# Patient Record
(demographics unavailable — no encounter records)

---

## 2024-10-14 NOTE — DISCUSSION/SUMMARY
[de-identified] : The underlying pathophysiology was reviewed with the patient. XR films were reviewed with the patient. Discussed at length the nature of the patient's condition. Advised to start physical therapy and script given.   Option of shoulder replacement was discussed. Patient was advised to continue with therapy for increased ROM and strengthening. A new script was provided.  The patient was encouraged to take Calcium Citrate, Vitamin D3 and Vitamin C along with a high calcium diet is advised to promote bone health and healing. Patient can continue activities as tolerated. All questions answered, understanding verbalized. Patient in agreement with plan of care.   Patient was advised to follow up PRN.

## 2024-10-14 NOTE — PHYSICAL EXAM
[de-identified] : Patient is WDWN, alert, and in no acute distress. Breathing is unlabored. She is grossly oriented to person, place, and time.  Right Shoulder:  Inspection/ palpation: There is tenderness present,  Range of motion: 90 degrees ROM  Strength: Flexion and extension 5/5.  Stability: No joint instability on provocative testing. No skin lesions or discoloration. [de-identified] : AP, lateral and oblique views of the right shoulder were obtained today and revealed a displaced comminuted fractures of the right humeral head/and mid diaphysis, Fracture is fully healed.

## 2024-10-14 NOTE — HISTORY OF PRESENT ILLNESS
[de-identified] : Pt is a 74 y/o female with right humeral neck and shaft fracture.  She tripped and fell in her daughters home on 10/29/23.  She had pain immediately.  She went to [facility] ED where xrays revealed a displaced humeral shaft fracture.  The fracture was reduced and a splint was applied.  She was admitted to the hospital for pain control.  She was discharged this morning.  She is taking Tylenol for pain and was given oxycodone. She returns on 12/07/2023 for follow up. She reports significant discomfort but not so much pain.    Today, Oct 14,2024 patient is here for a follow up and further evaluation. She states that the pain is persistent.

## 2024-10-14 NOTE — ADDENDUM
[FreeTextEntry1] : This note was written by Jose Batres on 10/14/2024 actively solely JOÃO Carroll M.D.     All medical record entries made by the Scribe were at my, JOÃO Carroll M.D. direction and personally dictated by me on 10/14/2024. I have personally reviewed the chart and agree that the record reflects my personal performance of the history, physical exam, assessment, and plan.

## 2024-10-22 NOTE — PHYSICAL EXAM
[No Acute Distress] : no acute distress [Well Nourished] : well nourished [Normal Voice/Communication] : normal voice/communication [Normal Sclera/Conjunctiva] : normal sclera/conjunctiva [Normal Outer Ear/Nose] : the outer ears and nose were normal in appearance [Normal] : normal gait, coordination grossly intact, no focal deficits and deep tendon reflexes were 2+ and symmetric [de-identified] : reduced ROM R shoulder

## 2024-10-22 NOTE — HEALTH RISK ASSESSMENT
[Good] : ~his/her~  mood as  good [No] : No [Any fall with injury in past year] : Patient reported fall with injury in the past year [Little interest or pleasure doing things] : 1) Little interest or pleasure doing things [Feeling down, depressed, or hopeless] : 2) Feeling down, depressed, or hopeless [0] : 2) Feeling down, depressed, or hopeless: Not at all (0) [PHQ-2 Negative - No further assessment needed] : PHQ-2 Negative - No further assessment needed [de-identified] : had fracture R  humerus [WQC4Lltpt] : 0 [Never] : Never [Patient reported mammogram was normal] : Patient reported mammogram was normal [Patient reported bone density results were normal] : Patient reported bone density results were normal [Patient reported colonoscopy was normal] : Patient reported colonoscopy was normal [Change in mental status noted] : No change in mental status noted [None] : None [With Significant Other] : lives with significant other [Retired] : retired [Sexually Active] : not sexually active [Feels Safe at Home] : Feels safe at home [Fully functional (bathing, dressing, toileting, transferring, walking, feeding)] : Fully functional (bathing, dressing, toileting, transferring, walking, feeding) [Reports changes in hearing] : Reports no changes in hearing [Reports changes in vision] : Reports no changes in vision [Reports changes in dental health] : Reports no changes in dental health [Smoke Detector] : smoke detector [Carbon Monoxide Detector] : carbon monoxide detector [Safety elements used in home] : safety elements used in home [Seat Belt] :  uses seat belt [MammogramDate] : 2024 [MammogramComments] : nl  [BoneDensityDate] : 2022 [ColonoscopyDate] : 2018  [de-identified] : needs  help w/ [facility]  [Name: ___] : Health Care Proxy's Name: [unfilled]  [Relationship: ___] : Relationship: [unfilled] [AdvancecareDate] : 10/22/24

## 2024-10-22 NOTE — ASSESSMENT
[FreeTextEntry1] : 1) Wellness check - diet / exercise  discussed  - home safety addressed  - check labs - flu shot  - UTD PCV/PPSV/Tdap / shingrix  - optho    2) HTN  - ct losartan  - f/u in 2 month  - pt not compliant w/ low salt diet- recheck in 2 month   3) thyroid US , mammo , BMD next year

## 2025-01-15 NOTE — DISCUSSION/SUMMARY
[de-identified] : s/p L3-4 laminectomy and diskectomy on the left, L3-5 in-situ fusion on 08/20/24.  Left TKR. Doing well. Is happy with surgical results. Discussed all options. Voltaren Gel PRN. Referral for physical therapy. All options discussed including rest, medicine, home exercise, acupuncture, Chiropractic care, Physical Therapy, Pain management, and last resort surgery. All questions were answered, all alternatives discussed, and the patient is in complete agreement with the treatment plan which the patient contributed to and discussed with me through the shared decision-making process. Follow-up appointment as instructed. Any issues and the patient will call or come in sooner.

## 2025-01-15 NOTE — ADDENDUM
[FreeTextEntry1] : This note was written by Nestor Velazquez on 01/15/2025 acting as scribe for Dr. Cristobal Calderon M.D.  I, Cristobal Calderon MD, have read and attest that all the information, medical decision making and discharge instructions within are true and accurate.

## 2025-01-15 NOTE — PHYSICAL EXAM
[Normal] : Gait: normal [Joseph's Sign] : negative Joseph's sign [Pronator Drift] : negative pronator drift [SLR] : negative straight leg raise [de-identified] : 5 out of 5 motor strength, sensation is intact and symmetrical full range of motion flexion extension and rotation, no palpatory tenderness full range of motion of hips knees shoulders and elbows (all four extremities), no atrophy, negative straight leg raise, no pathological reflexes, no swelling, normal ambulation, no apparent distress skin is intact, no palpable lymph nodes, no upper or lower extremity instability, alert and oriented x3 and normal mood. Normal finger-to nose test. No upper motor neuron findings. Incision healing. [de-identified] : XR AP Lat Left Knee 1/15/25 -TKR- reviewed with patient.   XR AP Lat Lumbar 8/29/24 -L3-4 laminectomy, L3-5 in-situ fusion- adequate

## 2025-01-15 NOTE — HISTORY OF PRESENT ILLNESS
[Improving] : improving [de-identified] : 73 year old female presents s/p L3-4 laminectomy and diskectomy on the left, L3-5 in-situ fusion on 08/20/24.  Pre-operative leg pain has improved.  She complains of pain at the lower back and left knee pain that extends up to the mid thigh.  No fever chills sweats nausea vomiting no bowel or bladder dysfunction, no recent weight loss or gain no night pain. This history is in addition to the intake form that I personally reviewed.

## 2025-01-23 NOTE — ASSESSMENT
[FreeTextEntry1] : 1) HTN - ct losartan 100qd  - LOW SALT DIET   2) GERD - lifestyle modification - ct PRN PPI   3) Sciatica  - ct P/T

## 2025-01-23 NOTE — HISTORY OF PRESENT ILLNESS
[FreeTextEntry1] : f/u HTN / GERD / low back pain ( post surgery)  taking 400 Advil in AM for back pain per ortho adv  no chest pain or SOB  No falls under a lot of stress bd 's illness

## 2025-02-27 NOTE — PHYSICAL EXAM
[Walker] : ambulates with walker [Joseph's Sign] : negative Joseph's sign [Pronator Drift] : negative pronator drift [SLR] : negative straight leg raise [de-identified] : 5 out of 5 motor strength, sensation is intact and symmetrical full range of motion flexion extension and rotation, no palpatory tenderness full range of motion of hips knees shoulders and elbows (all four extremities), no atrophy, negative straight leg raise, no pathological reflexes, no swelling, normal ambulation, no apparent distress skin is intact, no palpable lymph nodes, no upper or lower extremity instability, alert and oriented x3 and normal mood. Normal finger-to nose test. No upper motor neuron findings. Incision healing. Left trochanter pain. Left SI joint pain. [de-identified] : XR AP Lat Left Knee 1/15/25 -TKR- reviewed with patient.   XR AP Lat Lumbar 8/29/24 -L3-4 laminectomy, L3-5 in-situ fusion- adequate

## 2025-02-27 NOTE — ADDENDUM
[FreeTextEntry1] : This note was written by Nestor Velazquez on 02/27/2025 acting as scribe for Dr. Cristobal Calderon M.D.  I, Cristobal Calderon MD, have read and attest that all the information, medical decision making and discharge instructions within are true and accurate.

## 2025-02-27 NOTE — HISTORY OF PRESENT ILLNESS
[Improving] : improving [de-identified] : 73 year old female presents s/p L3-4 laminectomy and diskectomy on the left, L3-5 in-situ fusion on 08/20/24.  She notes that she has been having increased pain at the lower back that radiates to the left anterior thigh and knee. Denies numbness/tingling. She is taking advil for the pain.  She is ambulating with a walker since the pain started. No fever chills sweats nausea vomiting no bowel or bladder dysfunction, no recent weight loss or gain no night pain. This history is in addition to the intake form that I personally reviewed.

## 2025-02-27 NOTE — DISCUSSION/SUMMARY
[de-identified] : s/p L3-4 laminectomy and diskectomy on the left, L3-5 in-situ fusion on 08/20/24.  Left TKR. Left trochanteric Bursitis. Left sacroiliitis. Discussed all options. MDP. I offered an injection after all risks were explained including allergic reaction to an infection under sterile conditions 1 mg of Depo-Medrol and 2 cc of 1% Lidocaine without epinephrine was injected into the painful site. The patient tolerated the procedure well and received significant relief following the injection. (left SI) All options discussed including rest, medicine, home exercise, acupuncture, Chiropractic care, Physical Therapy, Pain management, and last resort surgery. All questions were answered, all alternatives discussed, and the patient is in complete agreement with the treatment plan which the patient contributed to and discussed with me through the shared decision-making process. Follow-up appointment as instructed. Any issues and the patient will call or come in sooner.

## 2025-03-03 NOTE — PHYSICAL EXAM
[Walker] : ambulates with walker [Joseph's Sign] : negative Joseph's sign [Pronator Drift] : negative pronator drift [SLR] : negative straight leg raise [de-identified] : 5 out of 5 motor strength, sensation is intact and symmetrical full range of motion flexion extension and rotation, no palpatory tenderness full range of motion of hips knees shoulders and elbows (all four extremities), no atrophy, negative straight leg raise, no pathological reflexes, no swelling, normal ambulation, no apparent distress skin is intact, no palpable lymph nodes, no upper or lower extremity instability, alert and oriented x3 and normal mood. Normal finger-to nose test. No upper motor neuron findings. Incision healing. Left trochanter pain. Left SI joint pain. [de-identified] : XR AP Lat Left Knee 1/15/25 -TKR- reviewed with patient.   XR AP Lat Lumbar 8/29/24 -L3-4 laminectomy, L3-5 in-situ fusion- adequate

## 2025-03-03 NOTE — DISCUSSION/SUMMARY
[de-identified] : s/p L3-4 laminectomy and diskectomy on the left, L3-5 in-situ fusion on 08/20/24.  Left TKR. Left trochanteric Bursitis. Left sacroiliitis. Discussed all options.  All options discussed including rest, medicine, home exercise, acupuncture, Chiropractic care, Physical Therapy, Pain management, and last resort surgery. All questions were answered, all alternatives discussed, and the patient is in complete agreement with the treatment plan which the patient contributed to and discussed with me through the shared decision-making process. Follow-up appointment as instructed. Any issues and the patient will call or come in sooner.

## 2025-03-03 NOTE — HISTORY OF PRESENT ILLNESS
[de-identified] : 73 year old female presents s/p L3-4 laminectomy and diskectomy on the left, L3-5 in-situ fusion on 08/20/24.  She notes that she has been having increased pain at the lower back that radiates to the left anterior thigh and knee. Denies numbness/tingling. She is taking advil for the pain.  She is ambulating with a walker since the pain started. Was given left SI joint injection at last visit.  No fever chills sweats nausea vomiting no bowel or bladder dysfunction, no recent weight loss or gain no night pain. This history is in addition to the intake form that I personally reviewed.  [Improving] : improving

## 2025-03-10 NOTE — DISCUSSION/SUMMARY
[de-identified] : s/p L3-4 laminectomy and diskectomy on the left, L3-5 in-situ fusion on 08/20/24.  Left TKR. Left trochanteric Bursitis. Left sacroiliitis. Discussed all options.  All options discussed including rest, medicine, home exercise, acupuncture, Chiropractic care, Physical Therapy, Pain management, and last resort surgery. All questions were answered, all alternatives discussed, and the patient is in complete agreement with the treatment plan which the patient contributed to and discussed with me through the shared decision-making process. Follow-up appointment as instructed. Any issues and the patient will call or come in sooner.

## 2025-03-10 NOTE — HISTORY OF PRESENT ILLNESS
[de-identified] : 73 year old female presents s/p L3-4 laminectomy and diskectomy on the left, L3-5 in-situ fusion on 08/20/24.  She notes that she has been having increased pain at the lower back that radiates to the left anterior thigh and knee. Denies numbness/tingling. She is taking advil for the pain.  She is ambulating with a walker since the pain started. Was given left SI joint injection at last visit.  No fever chills sweats nausea vomiting no bowel or bladder dysfunction, no recent weight loss or gain no night pain. This history is in addition to the intake form that I personally reviewed.  [Improving] : improving

## 2025-03-10 NOTE — PHYSICAL EXAM
[Walker] : ambulates with walker [Joseph's Sign] : negative Joseph's sign [Pronator Drift] : negative pronator drift [SLR] : negative straight leg raise [de-identified] : 5 out of 5 motor strength, sensation is intact and symmetrical full range of motion flexion extension and rotation, no palpatory tenderness full range of motion of hips knees shoulders and elbows (all four extremities), no atrophy, negative straight leg raise, no pathological reflexes, no swelling, normal ambulation, no apparent distress skin is intact, no palpable lymph nodes, no upper or lower extremity instability, alert and oriented x3 and normal mood. Normal finger-to nose test. No upper motor neuron findings. Incision healing. Left trochanter pain. Left SI joint pain. [de-identified] : XR AP Lat Left Knee 1/15/25 -TKR- reviewed with patient.   XR AP Lat Lumbar 8/29/24 -L3-4 laminectomy, L3-5 in-situ fusion- adequate

## 2025-03-19 NOTE — HISTORY OF PRESENT ILLNESS
[Improving] : improving [de-identified] : 73 year old female presents s/p L3-4 laminectomy and diskectomy on the left, L3-5 in-situ fusion on 08/20/24.  She notes that she has been having increased pain at the lower back that radiates to the left anterior thigh and knee. Denies numbness/tingling. She is taking advil for the pain.  She is ambulating with a walker since the pain started. Was given left SI joint injection at last visit - only had 1-2 days of relief. She is requesting repeat injection. No fever chills sweats nausea vomiting no bowel or bladder dysfunction, no recent weight loss or gain no night pain. This history is in addition to the intake form that I personally reviewed.

## 2025-03-19 NOTE — ADDENDUM
[FreeTextEntry1] : This note was written by Nestor Velazquez on 03/19/2025 acting as scribe for Dr. Cristobal Calderon M.D.  I, Cristobal Calderon MD, have read and attest that all the information, medical decision making and discharge instructions within are true and accurate.

## 2025-03-19 NOTE — DISCUSSION/SUMMARY
[de-identified] : s/p L3-4 laminectomy and diskectomy on the left, L3-5 in-situ fusion on 08/20/24.  Left TKR. Left trochanteric Bursitis. Left sacroiliitis. Discussed all options. I offered an injection after all risks were explained including allergic reaction to an infection under sterile conditions 1 mg of Depo-Medrol and 2 cc of 1% Lidocaine without epinephrine was injected into the painful site. The patient tolerated the procedure well and received significant relief following the injection. (left SI) Referred to Dr. Leandro Nova for neurological evaluation. All options discussed including rest, medicine, home exercise, acupuncture, Chiropractic care, Physical Therapy, Pain management, and last resort surgery. All questions were answered, all alternatives discussed, and the patient is in complete agreement with the treatment plan which the patient contributed to and discussed with me through the shared decision-making process. Follow-up appointment as instructed. Any issues and the patient will call or come in sooner.

## 2025-03-19 NOTE — PHYSICAL EXAM
[Walker] : ambulates with walker [Joseph's Sign] : negative Joseph's sign [Pronator Drift] : negative pronator drift [SLR] : negative straight leg raise [de-identified] : 5 out of 5 motor strength, sensation is intact and symmetrical full range of motion flexion extension and rotation, no palpatory tenderness full range of motion of hips knees shoulders and elbows (all four extremities), no atrophy, negative straight leg raise, no pathological reflexes, no swelling, normal ambulation, no apparent distress skin is intact, no palpable lymph nodes, no upper or lower extremity instability, alert and oriented x3 and normal mood. Normal finger-to nose test. No upper motor neuron findings. Left trochanter pain. Left SI joint pain. [de-identified] : XR AP Lat Left Knee 1/15/25 -TKR- reviewed with patient.   XR AP Lat Lumbar 8/29/24 -L3-4 laminectomy, L3-5 in-situ fusion- adequate